# Patient Record
Sex: MALE | Race: WHITE | Employment: FULL TIME | URBAN - METROPOLITAN AREA
[De-identification: names, ages, dates, MRNs, and addresses within clinical notes are randomized per-mention and may not be internally consistent; named-entity substitution may affect disease eponyms.]

---

## 2024-06-10 ENCOUNTER — OFFICE VISIT (OUTPATIENT)
Dept: PRIMARY CARE CLINIC | Age: 34
End: 2024-06-10
Payer: COMMERCIAL

## 2024-06-10 VITALS
OXYGEN SATURATION: 95 % | TEMPERATURE: 97.9 F | HEIGHT: 71 IN | RESPIRATION RATE: 17 BRPM | DIASTOLIC BLOOD PRESSURE: 60 MMHG | HEART RATE: 70 BPM | SYSTOLIC BLOOD PRESSURE: 104 MMHG | WEIGHT: 222.8 LBS | BODY MASS INDEX: 31.19 KG/M2

## 2024-06-10 DIAGNOSIS — Z23 IMMUNIZATION DUE: ICD-10-CM

## 2024-06-10 DIAGNOSIS — Z11.4 ENCOUNTER FOR SCREENING FOR HIV: ICD-10-CM

## 2024-06-10 DIAGNOSIS — Z11.59 ENCOUNTER FOR HEPATITIS C SCREENING TEST FOR LOW RISK PATIENT: ICD-10-CM

## 2024-06-10 DIAGNOSIS — Z13.220 SCREENING CHOLESTEROL LEVEL: ICD-10-CM

## 2024-06-10 DIAGNOSIS — Z00.00 HEALTHCARE MAINTENANCE: Primary | ICD-10-CM

## 2024-06-10 DIAGNOSIS — Z13.1 DIABETES MELLITUS SCREENING: ICD-10-CM

## 2024-06-10 PROCEDURE — 90471 IMMUNIZATION ADMIN: CPT | Performed by: FAMILY MEDICINE

## 2024-06-10 PROCEDURE — 90715 TDAP VACCINE 7 YRS/> IM: CPT | Performed by: FAMILY MEDICINE

## 2024-06-10 PROCEDURE — 99385 PREV VISIT NEW AGE 18-39: CPT | Performed by: FAMILY MEDICINE

## 2024-06-10 RX ORDER — CETIRIZINE HYDROCHLORIDE 10 MG/1
10 TABLET ORAL DAILY
COMMUNITY

## 2024-06-10 SDOH — ECONOMIC STABILITY: HOUSING INSECURITY
IN THE LAST 12 MONTHS, WAS THERE A TIME WHEN YOU DID NOT HAVE A STEADY PLACE TO SLEEP OR SLEPT IN A SHELTER (INCLUDING NOW)?: NO

## 2024-06-10 SDOH — ECONOMIC STABILITY: INCOME INSECURITY: HOW HARD IS IT FOR YOU TO PAY FOR THE VERY BASICS LIKE FOOD, HOUSING, MEDICAL CARE, AND HEATING?: NOT HARD AT ALL

## 2024-06-10 SDOH — ECONOMIC STABILITY: FOOD INSECURITY: WITHIN THE PAST 12 MONTHS, YOU WORRIED THAT YOUR FOOD WOULD RUN OUT BEFORE YOU GOT MONEY TO BUY MORE.: NEVER TRUE

## 2024-06-10 SDOH — ECONOMIC STABILITY: FOOD INSECURITY: WITHIN THE PAST 12 MONTHS, THE FOOD YOU BOUGHT JUST DIDN'T LAST AND YOU DIDN'T HAVE MONEY TO GET MORE.: NEVER TRUE

## 2024-06-10 ASSESSMENT — PATIENT HEALTH QUESTIONNAIRE - PHQ9
SUM OF ALL RESPONSES TO PHQ9 QUESTIONS 1 & 2: 0
SUM OF ALL RESPONSES TO PHQ QUESTIONS 1-9: 0
2. FEELING DOWN, DEPRESSED OR HOPELESS: NOT AT ALL
1. LITTLE INTEREST OR PLEASURE IN DOING THINGS: NOT AT ALL
SUM OF ALL RESPONSES TO PHQ QUESTIONS 1-9: 0

## 2024-06-10 ASSESSMENT — ENCOUNTER SYMPTOMS
DIARRHEA: 0
BLOOD IN STOOL: 0
COUGH: 0
RHINORRHEA: 0
VOMITING: 0
SHORTNESS OF BREATH: 0
NAUSEA: 0

## 2024-06-10 NOTE — PROGRESS NOTES
visit:    1. Healthcare maintenance  Assessment & Plan:  Overall doing well. Age appropriate screenings and immunization provided as per orders below.  2. Encounter for hepatitis C screening test for low risk patient  Assessment & Plan:  Age appropriate screening provided as per orders below.  Orders:  -     Hepatitis C Antibody; Future  3. Diabetes mellitus screening  Assessment & Plan:  Age appropriate screening provided as per orders below.  Orders:  -     Hemoglobin A1C; Future  -     Comprehensive Metabolic Panel; Future  4. Encounter for screening for HIV  Assessment & Plan:  Age appropriate screening provided as per orders below.  Orders:  -     HIV Screen; Future  5. Screening cholesterol level  Assessment & Plan:  Age appropriate screening provided as per orders below.  Orders:  -     Lipid Panel; Future  6. Immunization due  Assessment & Plan:  Age appropriate immunization provided as per orders below.  Orders:  -     Tdap, BOOSTRIX, (age 10 yrs+), IM    Education:  Routine anticipatory guidance provided. Other applicable patient education information provided in AVS.    Counseling  The patient was counseled regarding motor vehicle safety and sun exposure.  If needed, the patient was counseled regarding diet and exercise.  The patient was given information regarding the dangers of smoking and the overuse of alcohol.  The patient was counseled regarding Living Will/Durable Power-Of-.

## 2024-06-10 NOTE — PATIENT INSTRUCTIONS
For help and support with emaze ankush/portal set-up, please call [phone].     Please try to get ADHD and vaccine records if able.    Reminder: Please call to schedule dental exam when able.    Lifestyle modifications discussed today:    Exercise:    In accordance with AHA/ACC guidelines:    - Get at least 150 minutes per week of moderate-intensity aerobic activity OR 75 minutes per week of vigorous aerobic activity, OR a combination of both, preferably spread throughout the week.    - Add moderate- to high-intensity muscle-strengthening activity (such as resistance or weights) on at least 2 days per week.    - Gain even more benefits by being active at least 300 minutes (5 hours) per week.    Increase amount and intensity gradually over time.    Diet:    Dietary pattern should consist of vegetables, fruits and whole grains, low-fat dairy (or no dairy if intolerant), suggested poultry and fish. Consider legumes, vegetable oils and nuts and limiting intake of sweets, sugar, sweetened beverages and red meat.    Aim is to have 5% of calories from saturated fats or less and no trans fat.    Please find our [facility] Lab Location Guide below for your convenience!    CENTRAL LOCATIONS    1) [facility]  [address]  [place], [place] [address]  Phone: [phone]    2) [facility]  [address], [place] [address]  Phone: [phone]    [facility] LOCATIONS    3) [facility]  [address]  [place], [place] [address]  Phone: [phone]    4) [facility]  [address] [place] [address]  Phone: [phone]    5) [facility]  [address] [place] [address]  Phone: [phone]    6) [facility]  [address]  [place] [address]   Phone: [phone]    [place] LOCATIONS    7) [facility]  [address]  [place] [address]  Phone: [phone]    8) [facility]

## 2024-07-10 PROBLEM — Z13.1 DIABETES MELLITUS SCREENING: Status: RESOLVED | Noted: 2024-06-10 | Resolved: 2024-07-10

## 2024-07-10 PROBLEM — Z11.59 ENCOUNTER FOR HEPATITIS C SCREENING TEST FOR LOW RISK PATIENT: Status: RESOLVED | Noted: 2024-06-10 | Resolved: 2024-07-10

## 2024-07-10 PROBLEM — Z11.4 ENCOUNTER FOR SCREENING FOR HIV: Status: RESOLVED | Noted: 2024-06-10 | Resolved: 2024-07-10

## 2024-07-10 PROBLEM — Z00.00 HEALTHCARE MAINTENANCE: Status: RESOLVED | Noted: 2024-06-10 | Resolved: 2024-07-10

## 2024-07-10 PROBLEM — Z13.220 SCREENING CHOLESTEROL LEVEL: Status: RESOLVED | Noted: 2024-06-10 | Resolved: 2024-07-10

## 2024-07-26 DIAGNOSIS — Z30.09 FAMILY PLANNING: Primary | ICD-10-CM
